# Patient Record
Sex: FEMALE
[De-identification: names, ages, dates, MRNs, and addresses within clinical notes are randomized per-mention and may not be internally consistent; named-entity substitution may affect disease eponyms.]

---

## 2023-11-20 ENCOUNTER — DASHBOARD ENCOUNTERS (OUTPATIENT)
Age: 53
End: 2023-11-20

## 2023-11-20 ENCOUNTER — OFFICE VISIT (OUTPATIENT)
Dept: URBAN - METROPOLITAN AREA CLINIC 115 | Facility: CLINIC | Age: 53
End: 2023-11-20
Payer: COMMERCIAL

## 2023-11-20 VITALS
TEMPERATURE: 97.8 F | WEIGHT: 148 LBS | SYSTOLIC BLOOD PRESSURE: 117 MMHG | HEIGHT: 67 IN | DIASTOLIC BLOOD PRESSURE: 70 MMHG | BODY MASS INDEX: 23.23 KG/M2 | HEART RATE: 74 BPM

## 2023-11-20 DIAGNOSIS — D64.89 ANEMIA DUE TO OTHER CAUSE: ICD-10-CM

## 2023-11-20 PROCEDURE — 99204 OFFICE O/P NEW MOD 45 MIN: CPT | Performed by: INTERNAL MEDICINE

## 2023-11-20 RX ORDER — ATORVASTATIN CALCIUM 40 MG/1
1 TABLET TABLET, FILM COATED ORAL ONCE A DAY
Status: ACTIVE | COMMUNITY

## 2023-11-20 NOTE — HPI-OTHER HISTORIES
The patient is here for evaluation for anemia. States she has had low iron over the last 3 years; has had a hx of taking iron on/off [employer]; now taking 325mg qd.  Labs from 6/2023 show Hgb of 11.5, MCV ~85. Hgb ranges from 11.3-11.6 over the last 3 years, MCH ranges from 25.8 to 26.3, MCHC 29 to 30.7. Reports two episodes over the last year with a red streak in her stool. Denies fatgue, melena, hematemesis, weight loss/gain, fever, chills, night sweats, abdominal pain. Postmenopausal as of 2017.  States she eats oatmeal, fruits, salmon with spinach vs. garlic bread with cheese.  Denies FHx of CRC. Maternal grandfather passed of gastric cancer (dx'd at 80s). Father had prostate cancer.  Colonoscopy 2019 found diverticulosis and hemorrhoids.  Previous surgeries: myomectomy 2005.

## 2023-11-21 LAB
FERRITIN, SERUM: 459
FOLATE (FOLIC ACID), SERUM: >24
IRON BIND.CAP.(TIBC): 266
IRON SATURATION: 30
IRON: 80
VITAMIN B12: 1211

## 2024-02-19 ENCOUNTER — OV EP (OUTPATIENT)
Dept: URBAN - METROPOLITAN AREA CLINIC 115 | Facility: CLINIC | Age: 54
End: 2024-02-19

## 2024-06-05 ENCOUNTER — TELEPHONE ENCOUNTER (OUTPATIENT)
Dept: URBAN - METROPOLITAN AREA CLINIC 23 | Facility: CLINIC | Age: 54
End: 2024-06-05

## 2024-06-18 ENCOUNTER — TELEPHONE ENCOUNTER (OUTPATIENT)
Dept: URBAN - METROPOLITAN AREA CLINIC 115 | Facility: CLINIC | Age: 54
End: 2024-06-18

## 2024-06-24 ENCOUNTER — OFFICE VISIT (OUTPATIENT)
Dept: URBAN - METROPOLITAN AREA CLINIC 115 | Facility: CLINIC | Age: 54
End: 2024-06-24

## 2024-07-08 ENCOUNTER — OFFICE VISIT (OUTPATIENT)
Dept: URBAN - METROPOLITAN AREA CLINIC 115 | Facility: CLINIC | Age: 54
End: 2024-07-08

## 2024-07-26 ENCOUNTER — OFFICE VISIT (OUTPATIENT)
Dept: URBAN - METROPOLITAN AREA CLINIC 115 | Facility: CLINIC | Age: 54
End: 2024-07-26
Payer: COMMERCIAL

## 2024-07-26 ENCOUNTER — LAB OUTSIDE AN ENCOUNTER (OUTPATIENT)
Dept: URBAN - METROPOLITAN AREA CLINIC 115 | Facility: CLINIC | Age: 54
End: 2024-07-26

## 2024-07-26 VITALS
DIASTOLIC BLOOD PRESSURE: 76 MMHG | WEIGHT: 152 LBS | HEART RATE: 64 BPM | TEMPERATURE: 97.7 F | SYSTOLIC BLOOD PRESSURE: 134 MMHG | BODY MASS INDEX: 23.86 KG/M2 | HEIGHT: 67 IN

## 2024-07-26 DIAGNOSIS — R74.01 ELEVATED ALT MEASUREMENT: ICD-10-CM

## 2024-07-26 DIAGNOSIS — R79.89 ELEVATED FERRITIN: ICD-10-CM

## 2024-07-26 DIAGNOSIS — D64.89 ANEMIA DUE TO OTHER CAUSE: ICD-10-CM

## 2024-07-26 PROCEDURE — 99214 OFFICE O/P EST MOD 30 MIN: CPT | Performed by: INTERNAL MEDICINE

## 2024-07-26 RX ORDER — ATORVASTATIN CALCIUM 40 MG/1
1 TABLET TABLET, FILM COATED ORAL ONCE A DAY
Status: ACTIVE | COMMUNITY

## 2024-07-26 NOTE — HPI-OTHER HISTORIES
53 y/o female presents in return of chronic mild anemia. Also has new problem of elevated ALT (44).  She has mild elevation of Hbg between 11.3- 11.6 on labs from prior 3 years. Most recent labs 5/29/24 Hgb 11.4, Hct 37.9, MCV 88.1.  When I saw her last I offered endoscopic workup for anemia which she declined.  I checked ferritin (elevated 459), iron 80,TIBC 266, Sat 30.  Negative B12 or folate dfcy.  She was taking full dose iron. With normal iron levels suspected ferritin was acute phase reactant.  She requested and was given referal to Hematolgoist but has not kept appointment.  Last colonoscopy 2019, diverticulosis and hemorrhoids. Fam hx gastric cancer (grandmother), no CRC.  In addtion, May labs found elevated ALT.  Previously normal. Denies abdominal pain. No fam hx of liver disease. No signficant weight gain, no diet changes. Takes statin for many years, states cholesterol well controlled.  Stopped taking multivitamin, was worried about too much B12.

## 2024-07-26 NOTE — PHYSICAL EXAM CHEST:
breathing is unlabored without accessory muscle use. , normal breath sounds. [de-identified] : Patient has pain along the basal joint right thumb.  Positive axial grind.  Normal sensation normal cap refill.  No erythema ecchymoses or abrasions.  No instabilities.

## 2024-08-07 ENCOUNTER — OFFICE VISIT (OUTPATIENT)
Dept: URBAN - METROPOLITAN AREA CLINIC 82 | Facility: CLINIC | Age: 54
End: 2024-08-07

## 2024-08-08 ENCOUNTER — OFFICE VISIT (OUTPATIENT)
Dept: URBAN - METROPOLITAN AREA CLINIC 114 | Facility: CLINIC | Age: 54
End: 2024-08-08

## 2024-08-19 ENCOUNTER — OFFICE VISIT (OUTPATIENT)
Dept: URBAN - METROPOLITAN AREA CLINIC 115 | Facility: CLINIC | Age: 54
End: 2024-08-19

## 2024-08-23 ENCOUNTER — TELEPHONE ENCOUNTER (OUTPATIENT)
Dept: URBAN - METROPOLITAN AREA CLINIC 115 | Facility: CLINIC | Age: 54
End: 2024-08-23

## 2024-08-30 ENCOUNTER — TELEPHONE ENCOUNTER (OUTPATIENT)
Dept: URBAN - METROPOLITAN AREA CLINIC 115 | Facility: CLINIC | Age: 54
End: 2024-08-30

## 2024-09-05 ENCOUNTER — OFFICE VISIT (OUTPATIENT)
Dept: URBAN - METROPOLITAN AREA CLINIC 114 | Facility: CLINIC | Age: 54
End: 2024-09-05
Payer: COMMERCIAL

## 2024-09-05 DIAGNOSIS — R93.2 ABNORMAL ULTRASOUND OF LIVER: ICD-10-CM

## 2024-09-05 PROCEDURE — 76705 ECHO EXAM OF ABDOMEN: CPT | Performed by: INTERNAL MEDICINE

## 2024-09-16 ENCOUNTER — TELEPHONE ENCOUNTER (OUTPATIENT)
Dept: URBAN - METROPOLITAN AREA CLINIC 115 | Facility: CLINIC | Age: 54
End: 2024-09-16

## 2024-09-16 ENCOUNTER — WEB ENCOUNTER (OUTPATIENT)
Dept: URBAN - METROPOLITAN AREA CLINIC 82 | Facility: CLINIC | Age: 54
End: 2024-09-16

## 2024-09-20 ENCOUNTER — OFFICE VISIT (OUTPATIENT)
Dept: URBAN - METROPOLITAN AREA CLINIC 115 | Facility: CLINIC | Age: 54
End: 2024-09-20

## 2024-10-01 ENCOUNTER — WEB ENCOUNTER (OUTPATIENT)
Dept: URBAN - METROPOLITAN AREA CLINIC 82 | Facility: CLINIC | Age: 54
End: 2024-10-01

## 2024-10-10 ENCOUNTER — OFFICE VISIT (OUTPATIENT)
Dept: URBAN - METROPOLITAN AREA CLINIC 115 | Facility: CLINIC | Age: 54
End: 2024-10-10
Payer: COMMERCIAL

## 2024-10-10 VITALS
WEIGHT: 147 LBS | HEIGHT: 67 IN | TEMPERATURE: 98.3 F | SYSTOLIC BLOOD PRESSURE: 117 MMHG | BODY MASS INDEX: 23.07 KG/M2 | DIASTOLIC BLOOD PRESSURE: 69 MMHG | HEART RATE: 78 BPM

## 2024-10-10 DIAGNOSIS — R74.01 TRANSAMINITIS: ICD-10-CM

## 2024-10-10 DIAGNOSIS — D64.9 CHRONIC ANEMIA: ICD-10-CM

## 2024-10-10 DIAGNOSIS — R79.89 ELEVATED FERRITIN: ICD-10-CM

## 2024-10-10 PROBLEM — 191268006: Status: ACTIVE | Noted: 2024-10-10

## 2024-10-10 PROCEDURE — 99213 OFFICE O/P EST LOW 20 MIN: CPT

## 2024-10-10 RX ORDER — ATORVASTATIN CALCIUM 40 MG/1
1 TABLET TABLET, FILM COATED ORAL ONCE A DAY
Status: ACTIVE | COMMUNITY

## 2024-10-10 NOTE — HPI-OTHER HISTORIES
55 y/o female pt of Dr. Farmer presents for f/u.  She has mild elevation of Hbg between 11.3- 11.9 on labs from prior 3 years. Most recent CBC 5/29/24 Hgb 11.4, Hct 37.9, MCV 88.1. Declined EGD/COL for anemia. Labs from 11/2023 showed ferritin (elevated 459), iron 80,TIBC 266, Sat 30.  Negative B12 or folate dfcy.  She was taking full dose iron at this time.  Fam hx gastric cancer (grandmother), no CRC.  In addtion, May labs found elevated ALT.  Previously normal.  No fam hx of liver disease. Takes statin for many years, states cholesterol well controlled.  9/2024 US for elevated ALT/ferritin was negative. 8/2024 labs showed normal HFP, ferritin 347.  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 Posada) Had 9/2024 labs with neg CMP/vit B12/folate. Hgb was 11.9, MCV 84.4; per his note, her elevated ferritin is likely d/t reactive process; plan to repeat ferritin in 6 mos; not anemic at this time. Gets HSV outbreaks with increased stress which may have contributed. States she had a CT of her abdomen/pelvic with a urologist (report pending) for occult hematuria. Stopped taking iron as of June 2024. Denies fatigue, weakness, adela bleeding, abdominal pain, n/v, heartburn.  2019 COL only showed int hemorrhoids, diverticulosis; repeat in 10Y

## 2024-10-11 ENCOUNTER — OFFICE VISIT (OUTPATIENT)
Dept: URBAN - METROPOLITAN AREA CLINIC 81 | Facility: CLINIC | Age: 54
End: 2024-10-11

## 2024-11-25 ENCOUNTER — OFFICE VISIT (OUTPATIENT)
Dept: URBAN - METROPOLITAN AREA CLINIC 115 | Facility: CLINIC | Age: 54
End: 2024-11-25
Payer: COMMERCIAL

## 2024-11-25 ENCOUNTER — OFFICE VISIT (OUTPATIENT)
Dept: URBAN - METROPOLITAN AREA CLINIC 115 | Facility: CLINIC | Age: 54
End: 2024-11-25

## 2024-11-25 VITALS
DIASTOLIC BLOOD PRESSURE: 71 MMHG | BODY MASS INDEX: 23.39 KG/M2 | HEART RATE: 63 BPM | WEIGHT: 149 LBS | SYSTOLIC BLOOD PRESSURE: 120 MMHG | TEMPERATURE: 98 F | HEIGHT: 67 IN

## 2024-11-25 DIAGNOSIS — D64.9 CHRONIC ANEMIA: ICD-10-CM

## 2024-11-25 DIAGNOSIS — K57.10 DUODENAL DIVERTICULUM: ICD-10-CM

## 2024-11-25 DIAGNOSIS — R91.1 PULMONARY NODULE: ICD-10-CM

## 2024-11-25 PROBLEM — 762275008: Status: ACTIVE | Noted: 2024-11-25

## 2024-11-25 PROBLEM — 786838002: Status: ACTIVE | Noted: 2024-11-25

## 2024-11-25 PROCEDURE — 99212 OFFICE O/P EST SF 10 MIN: CPT

## 2024-11-25 RX ORDER — ATORVASTATIN CALCIUM 40 MG/1
1 TABLET TABLET, FILM COATED ORAL ONCE A DAY
Status: ACTIVE | COMMUNITY

## 2024-11-25 NOTE — HPI-OTHER HISTORIES
53 y/o female pt of Dr. Farmer presents for f/u.  She has mild elevation of Hgb between 11.3- 11.9 on labs from prior 3 years. Declined EGD/COL for anemia. Labs from 11/2023 showed ferritin (elevated 459), iron 80,TIBC 266, Sat 30.  Negative B12 or folate dfcy.  She was taking full dose iron at this time.  Fam hx gastric cancer (grandmother), no CRC.  In addtion, May labs found elevated ALT.  Previously normal.  9/2024 US for elevated ALT/ferritin was negative 8/2024 labs showed normal HFP, ferritin 347. 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 Neg viral hepatitis  No fam hx of liver disease. Takes statin for many years, states cholesterol well controlled.  She saw Dr. Posada for HFE and anemia. Had 9/2024 labs with neg CMP/vit B12/folate. Hgb was 11.9, MCV 84.4; per his note, her elevated ferritin is likely d/t reactive process; plan to repeat ferritin in 6 mos; not anemic at this time. Has f/u scheduled 4/2025.  Notes she gets HSV outbreaks with increased stress which may have contributed to ferritin levels.  Stopped taking iron as of June 2024. Denies fatigue, weakness, adela bleeding, abdominal pain, n/v, heartburn. 10/10/24 CT urogram showed duodenal diverticulum, subcm nodular opacity in LLL of lung and 8mm pulm nodule (rec f/u chest CT in 3-6mos).  2019 COL only showed int hemorrhoids, diverticulosis; repeat in 10Y

## 2025-08-27 ENCOUNTER — OFFICE VISIT (OUTPATIENT)
Dept: URBAN - NONMETROPOLITAN AREA CLINIC 4 | Facility: CLINIC | Age: 55
End: 2025-08-27

## 2025-08-27 ENCOUNTER — OFFICE VISIT (OUTPATIENT)
Dept: URBAN - NONMETROPOLITAN AREA CLINIC 4 | Facility: CLINIC | Age: 55
End: 2025-08-27
Payer: COMMERCIAL

## 2025-08-27 DIAGNOSIS — R79.89 ABNORMAL LFTS: ICD-10-CM

## 2025-08-27 DIAGNOSIS — K57.10 DUODENAL DIVERTICULUM: ICD-10-CM

## 2025-08-27 DIAGNOSIS — D64.9 CHRONIC ANEMIA: ICD-10-CM

## 2025-08-27 PROCEDURE — 99214 OFFICE O/P EST MOD 30 MIN: CPT | Performed by: INTERNAL MEDICINE

## 2025-08-27 RX ORDER — ATORVASTATIN CALCIUM 40 MG/1
1 TABLET TABLET, FILM COATED ORAL ONCE A DAY
Status: ACTIVE | COMMUNITY